# Patient Record
(demographics unavailable — no encounter records)

---

## 2025-01-06 NOTE — HISTORY OF PRESENT ILLNESS
[de-identified] : HUANG is a 54 year old female presenting reestablish care in preparation for planned laparoscopic sleeve gastrectomy. She last saw me in 2023 to discuss bariatric surgery.  Since then, she did try Ozempic over the summer, lost some weight, but then regained double what she lost.  She feels ready for surgery now.  Reports no other changes to her medical history.

## 2025-01-06 NOTE — HISTORY OF PRESENT ILLNESS
[de-identified] : HUANG is a 54 year old female presenting reestablish care in preparation for planned laparoscopic sleeve gastrectomy. She last saw me in 2023 to discuss bariatric surgery.  Since then, she did try Ozempic over the summer, lost some weight, but then regained double what she lost.  She feels ready for surgery now.  Reports no other changes to her medical history.

## 2025-01-06 NOTE — ASSESSMENT
[FreeTextEntry1] : 54-year-old female longstanding history of morbid obesity (BMI 46) who presents for evaluation for bariatric surgery.  Comorbidities include hypertension, high cholesterol, and she is being worked up for possible sleep apnea.  The patient has failed multiple prior attempts at weight loss and is now dealing with the side effects, risk factors, and poor quality of life associated with morbid obesity.  They would benefit from surgical weight loss as outlined in the NIH and  ASMBS consensus statements on bariatric surgery.  Surgical intervention is medically indicated.  My impression is that the patient is a reasonable candidate for laparoscopic sleeve gastrectomy.

## 2025-01-06 NOTE — PHYSICAL EXAM
[Obese, well nourished, in no acute distress] : obese, well nourished, in no acute distress [Normal] : PERRL, EOMI, no conjunctival infection, anicteric [de-identified] : nl respirations [de-identified] : soft NT nD

## 2025-01-06 NOTE — PHYSICAL EXAM
[Obese, well nourished, in no acute distress] : obese, well nourished, in no acute distress [Normal] : PERRL, EOMI, no conjunctival infection, anicteric [de-identified] : nl respirations [de-identified] : soft NT nD

## 2025-02-10 NOTE — ASSESSMENT
[FreeTextEntry1] : 54-year-old female longstanding history of morbid obesity (BMI 46) who presents for continued evaluation for bariatric surgery.  Comorbidities include hypertension, high cholesterol, and she is being worked up for possible sleep apnea.

## 2025-02-10 NOTE — PHYSICAL EXAM
[Obese, well nourished, in no acute distress] : obese, well nourished, in no acute distress [Normal] : PERRL, EOMI, no conjunctival infection, anicteric [de-identified] : nl respirations [de-identified] : soft NT nD

## 2025-02-10 NOTE — HISTORY OF PRESENT ILLNESS
[de-identified] : HUANG is a 54 year old female presenting for a monthly weight check prior to bariatric surgery. No changes in medical history reported.  Continuing to moderate portion sizes and make more healthful choices.  Increasing activity levels as much as possible.  She is practicing eating more slowly and chewing carefully. Completed EGD (report is pending) Saw cardiologist.

## 2025-02-10 NOTE — PHYSICAL EXAM
[Obese, well nourished, in no acute distress] : obese, well nourished, in no acute distress [Normal] : PERRL, EOMI, no conjunctival infection, anicteric [de-identified] : nl respirations [de-identified] : soft NT nD

## 2025-02-10 NOTE — HISTORY OF PRESENT ILLNESS
[de-identified] : HUANG is a 54 year old female presenting for a monthly weight check prior to bariatric surgery. No changes in medical history reported.  Continuing to moderate portion sizes and make more healthful choices.  Increasing activity levels as much as possible.  She is practicing eating more slowly and chewing carefully. Completed EGD (report is pending) Saw cardiologist.

## 2025-03-28 NOTE — PHYSICAL EXAM
[Obese, well nourished, in no acute distress] : obese, well nourished, in no acute distress [Normal] : PERRL, EOMI, no conjunctival infection, anicteric [de-identified] : nl respirations [de-identified] : soft NT nD

## 2025-03-28 NOTE — PLAN
[FreeTextEntry1] : Encouraged meal planning. Emphasized proper hydration while avoiding juice and soda.   Discussed high protein, low sugar diet. The patient is committed to learning and incorporating diet and exercise modifications to optimize success after bariatric surgery. Will need to see endocrinologist.  F/u 1 month

## 2025-03-28 NOTE — ASSESSMENT
[FreeTextEntry1] : 54-year-old female longstanding history of morbid obesity (BMI 46) who presents for continued evaluation for bariatric surgery.  Comorbidities include hypertension, high cholesterol, new dx of diabetes and hypothyroidism.

## 2025-03-28 NOTE — REASON FOR VISIT
[Home] : at home, [unfilled] , at the time of the visit. [Medical Office: ([facility])___] : at the medical office located in  [Verbal consent obtained from patient] : the patient, [unfilled] [Follow-Up Visit] : a follow-up visit for [Morbid Obesity (BMI>40)] : morbid obesity (bmi>40) [Telehealth (audio & video)] : This visit was provided via telehealth using real-time 2-way audio visual technology.

## 2025-03-28 NOTE — HISTORY OF PRESENT ILLNESS
[de-identified] : HUANG is a 55 year old female presenting for a monthly weight check prior to bariatric surgery.  Continuing to moderate portion sizes and make more healthful choices.  Increasing activity levels as much as possible.  Has EGD and UGIS scheduled. Was found to have hypothyroidism and started on synthroid by cardiologist. Also A1c reported at 7.2. Will be seeing new PCP Monday; awaiting call back from endocrinologist.

## 2025-03-31 NOTE — PHYSICAL EXAM
[AH] : no auditory hallucinations [VH] : no visual hallucinations [Suicidal Ideation] : no suicidal ideation [Homicidal Ideation] : no homicidal ideation [Normal] : good [FreeTextEntry1] : normal appearance, well nourished, obese [FreeTextEntry8] : "happy"

## 2025-03-31 NOTE — HISTORY OF PRESENT ILLNESS
[Genetics] : genetics [Poor Choices] : poor choices [Large Portions] : large portions [Emotional Eating] : emotional eating [Mindless Eating] : mindless eating [Grazing] : grazing  [de-identified] : Pt's motivation for surgery is to improve overall quality of life, have more energy, be more energetic and improve her overall health including DM2, HTN, high cholesterol and hyperlipidemia. Per pt, her highest weight is current weight of 277 lbs and her lowest weight was 125 lbs 15 years ago.  Her stated goal weight is 150 lbs and she expresses intent to make behavioral and dietary changes towards obtaining optimal results. [de-identified] : sleeve gastrectomy   [de-identified] : discussions with surgeons; discussions with others who've had bariatric surgery; watching online videos [de-identified] : none.  Pt denies ED hx and bxs, including binge eating.   [de-identified] : A current typical day of eating is reported as follows: B: 8:15 am Coffee, blueberry muffin L: 12-12:30  Skipping or fast food S: 3:30 either picks on something light or a sandwich, leftovers D: 6:30 pm Orders in mexican food, chinese food, or makes chicken cutlets S: Grazing while documenting-- sweets-- rice pudding with whipped cream, ice cream, pretzels Drinks: Water, seltzer, coffee [de-identified] : ozempic, diet pills, diet modification, making healthier food choices, walking. Despite multiple attempts at diet and exercise modifications, patient reports inability to maintain weight loss.

## 2025-03-31 NOTE — CURRENT PSYCHIATRIC SYMPTOMS
[de-identified] : Pt reports experiencing a depressed mood in the past during times of significant psychosocial stressors. She reports increasing her Citalopram from 20 mg to 40 mg this week due to the stressor of caring for her ill mother.  [de-identified] :  Pt denied current or past symptoms of savanah. [de-identified] : no delusions, no visual hallucinations, no auditory hallucinations and a thought disorder was not noted. No evidence of psychosis. [de-identified] : Pt reports some anxiety related to psychosocial stressors, at times leading to some increase in eating behaviors.  [de-identified] : denied [de-identified] : denied [FreeTextEntry1] : Pt reports that her mood with become down during psychosocial stressors such as going through a divorce and separation, the passing of her father, and currently caring for her ailing mother. She reports always being able to function and denied symptoms other than a depressed mood.  She reports attending therapy for 1 year in the late 1990's while going through a divorce as well as in 2016/2017 for about 4-5 months while going through a separation from her kids' father.   Ms. Tinsley reports being prescribed Citalopram (Celexa) by her PCP since 2016/207 and requested an increase this week due to the added stressor of caring for her mother. She also reports briefly taking medication prescribed by her OBGYN while postpartum 15 years ago.  She denied ever being hospitalized for a mental health problem.

## 2025-03-31 NOTE — REASON FOR VISIT
[Home] : at home, [unfilled] , at the time of the visit. [Other Location: e.g. Home (Enter Location, City,State)___] : at [unfilled] [Telehealth (audio & video)] : This visit was provided via telehealth using real-time 2-way audio visual technology. [Verbal consent obtained from patient] : the patient, [unfilled] [Initial Consult] : an initial consult for [Morbid Obesity (BMI>40)] : morbid obesity (bmi>40) [Referring By:  ___] : ~John Ln~ was referred for psychological evaluation by Dr. STOVALL [Attempted Weight Loss] : attempted weight loss [Commitment to Modified Lifestyle] : commitment to a modified lifestyle pre and post surgery [Difficulties with Diet Compliance] : difficulties with diet compliance  [Expectations of Outcome] : expectations of outcome [Motivation for Selecting Surgery] : motivation for selecting surgery [Strength of Social Support System] : strength of social support system [Patient Understands Data May be Shared] : patient understands that the information discussed during the evaluation would be shared with referring provider and possibly with ~his/her~ insurance provider [de-identified] : for evaluation of psychological appropriateness for bariatric surgery      [de-identified] : confidentiality and its limits were discussed

## 2025-03-31 NOTE — SOCIAL HISTORY
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  [FreeTextEntry4] : Bachelor's Degree in nursing.

## 2025-03-31 NOTE — DISCUSSION/SUMMARY
[Behavior plan developed] : Behavior plan developed [Strategies to improve adherence identified] : Strategies to improve adherence identified [Develop and refine illness management to behavior plan] : Develop and refine illness management to behavior plan [Identify, practice refine strategies to promote adherence to regimen] : Identify, practice refine strategies to promote adherence to regimen [FreeTextEntry1] : Based on the information presented in this assessment, Ms. ST does not have any current psychological contraindications for bariatric surgery. She is cleared for surgery from a psychological perspective. 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 Provided psychoeducation on changing eating behaviors in preparation for surgery. It was recommended that she attend the post-surgery group sessions for further education and support to assist her in making lifestyle changes. Additionally, it was recommended that she utilize writer and RD as needed to assist in making pre-surgical and post-surgical dietary and behavioral changes. Lastly, as pt endorsed some tendencies toward emotional eating, mindless eating, and grazing, she was advised to follow up with writer for skills development. [FreeTextEntry5] : compliance with behavioral and dietary recommendations [FreeTextEntry6] : reduction of obesity related co-morbidities [FreeTextEntry9] : Increased activity